# Patient Record
Sex: MALE | Race: WHITE | NOT HISPANIC OR LATINO | Employment: FULL TIME | URBAN - METROPOLITAN AREA
[De-identification: names, ages, dates, MRNs, and addresses within clinical notes are randomized per-mention and may not be internally consistent; named-entity substitution may affect disease eponyms.]

---

## 2021-01-20 ENCOUNTER — OFFICE VISIT (OUTPATIENT)
Dept: URGENT CARE | Facility: CLINIC | Age: 56
End: 2021-01-20
Payer: MEDICARE

## 2021-01-20 VITALS
DIASTOLIC BLOOD PRESSURE: 78 MMHG | SYSTOLIC BLOOD PRESSURE: 118 MMHG | TEMPERATURE: 98.2 F | HEIGHT: 71 IN | WEIGHT: 311 LBS | HEART RATE: 101 BPM | BODY MASS INDEX: 43.54 KG/M2 | RESPIRATION RATE: 20 BRPM | OXYGEN SATURATION: 97 %

## 2021-01-20 DIAGNOSIS — H00.035 CELLULITIS OF LEFT LOWER EYELID: ICD-10-CM

## 2021-01-20 DIAGNOSIS — H00.025 HORDEOLUM INTERNUM LEFT LOWER EYELID: Primary | ICD-10-CM

## 2021-01-20 PROCEDURE — 99203 OFFICE O/P NEW LOW 30 MIN: CPT | Performed by: PHYSICIAN ASSISTANT

## 2021-01-20 RX ORDER — CEPHALEXIN 500 MG/1
500 CAPSULE ORAL EVERY 12 HOURS SCHEDULED
Qty: 14 CAPSULE | Refills: 0 | Status: SHIPPED | OUTPATIENT
Start: 2021-01-20 | End: 2021-01-27

## 2021-01-20 RX ORDER — PRAZOSIN HYDROCHLORIDE 5 MG/1
5 CAPSULE ORAL 2 TIMES DAILY
COMMUNITY

## 2021-01-20 RX ORDER — ERYTHROMYCIN 5 MG/G
0.5 OINTMENT OPHTHALMIC EVERY 12 HOURS SCHEDULED
Qty: 7 G | Refills: 0 | Status: SHIPPED | OUTPATIENT
Start: 2021-01-20

## 2021-01-20 NOTE — PATIENT INSTRUCTIONS
[facility]   WHAT YOU NEED TO KNOW:   A stye is a lump on the edge or inside of your eyelid caused by an infection  A stye can form on your upper or lower eyelid  It usually goes away in 2 to 4 days  DISCHARGE INSTRUCTIONS:   Medicines:   · Antibiotic medicine: This is given as an ointment to put into your eye  It is used to fight an infection caused by bacteria  Use as directed  · Take your medicine as directed  Contact your healthcare provider if you think your medicine is not helping or if you have side effects  Tell him of her if you are allergic to any medicine  Keep a list of the medicines, vitamins, and herbs you take  Include the amounts, and when and why you take them  Bring the list or the pill bottles to follow-up visits  Carry your medicine list with you in case of an emergency  Follow up with your healthcare provider as directed:  Write down your questions so you remember to ask them during your visits  Self-care:   · Use warm compresses: This will help decrease swelling and pain  Wet a clean washcloth with warm water and place it on your eye for 10 to 15 minutes, 3 to 4 times each day or as directed  · Keep your hands away from your eye: This helps to prevent the spread of the infection to other parts of the eye  Wash your hands often with soap and dry with a clean towel  Do not squeeze the stye  · Do not use eye makeup:  Do not wear eye makeup while you have a stye  Eye makeup may carry bacteria and cause another stye  Throw away eye makeup and brushes used to apply the makeup  Use new eye makeup after the stye has gone away  Do not share eye makeup with others  · Prevent another stye:  Wash your face and clean your eyelashes every day  Remove eye makeup with makeup remover  This helps to completely remove eye makeup without heavy rubbing  Contact your healthcare provider if:   · You have redness and discharge around your eye, and your eye pain is getting worse      · Your vision changes  · The stye has not gone away within 7 days  · The stye comes back within a short period of time after treatment  · You have questions or concerns about your condition or care  © Copyright [address] Information is for End User's use only and may not be sold, redistributed or otherwise used for commercial purposes  All illustrations and images included in CareNotes® are the copyrighted property of A D A M , Inc  or [facility] Halina Harrison   The above information is an  only  It is not intended as medical advice for individual conditions or treatments  Talk to your doctor, nurse or pharmacist before following any medical regimen to see if it is safe and effective for you

## 2021-01-20 NOTE — PROGRESS NOTES
[facility]        NAME: Sapna Olvera is a 50 y o  male  : 1965    MRN: [id number]  DATE: 2021  TIME: 7:10 PM    Assessment and Plan   Hordeolum internum left lower eyelid [H00 025]  1  Hordeolum internum left lower eyelid  cephalexin (KEFLEX) 500 mg capsule    erythromycin (ILOTYCIN) ophthalmic ointment   2  Cellulitis of left lower eyelid  cephalexin (KEFLEX) 500 mg capsule    erythromycin (ILOTYCIN) ophthalmic ointment         Patient Instructions     Discussed condition with pt  He appears to have an internal stye left lower lid that is infected and draining and is now causing left lower lid inflammation  Will prescribe him combination of oral and topical abx and rec warm compresses to the eye and keeping clear of crusting/discharge  Should be reevaluated if condition persists/worsens  Follow up with PCP in 3-5 days  Proceed to  ER if symptoms worsen  Chief Complaint     Chief Complaint   Patient presents with    Eye Problem     Pt here for left eye issues pt states   swelling under his eye  that has been draining,  no meds used  History of Present Illness       Pt presents with a few day history of left lower eyelid swelling, redness, pain, discharge  He denies injury/trauma  Reports crusting on lids as well  Denies photophobia, visual changes, or any symptoms in the right eye  Has had similar condition in the past that was treated with ointment and it resolved  Review of Systems   Review of Systems   Constitutional: Negative  Eyes: Positive for pain, discharge and redness  Negative for photophobia, itching and visual disturbance  Pertinent positives pertain to left lower eyelid   Respiratory: Negative  Cardiovascular: Negative  Gastrointestinal: Negative  Genitourinary: Negative            Current Medications       Current Outpatient Medications:     prazosin (MINIPRESS) 5 mg capsule, Take 5 mg by mouth 2 (two) times a day, Disp: , Rfl:   cephalexin (KEFLEX) 500 mg capsule, Take 1 capsule (500 mg total) by mouth every 12 (twelve) hours for 7 days, Disp: 14 capsule, Rfl: 0    erythromycin (ILOTYCIN) ophthalmic ointment, Administer 0 5 inches into the left eye every 12 (twelve) hours, Disp: 7 g, Rfl: 0    Current Allergies     Allergies as of 01/20/2021 - Reviewed 01/20/2021   Allergen Reaction Noted    Minipress [prazosin] Hives 01/20/2021            The following portions of the patient's history were reviewed and updated as appropriate: allergies, current medications, past family history, past medical history, past social history, past surgical history and problem list      History reviewed  No pertinent past medical history  History reviewed  No pertinent surgical history  History reviewed  No pertinent family history  Medications have been verified  Objective   /78 (BP Location: Right arm, Patient Position: Sitting, Cuff Size: Large)   Pulse 101   Temp 98 2 °F (36 8 °C) (Tympanic)   Resp 20   Ht 5' 10 5" (1 791 m)   Wt (!) 141 kg (311 lb)   SpO2 97%   BMI 43 99 kg/m²   No LMP for male patient  Physical Exam     Physical Exam  Vitals signs reviewed  Constitutional:       General: He is not in acute distress  Appearance: He is well-developed  Eyes:      Comments: Left lower lid with diffuse mild STS, erythema, crusting on lid margins, and small amt of purulent discharge  There is a visible internal hordeolum present  Mild conjunctival injection  No photophobia  Right eye exam is normal     Neurological:      Mental Status: He is alert and oriented to person, place, and time

## 2024-01-29 ENCOUNTER — OFFICE VISIT (OUTPATIENT)
Dept: URGENT CARE | Facility: CLINIC | Age: 59
End: 2024-01-29
Payer: MEDICARE

## 2024-01-29 VITALS
SYSTOLIC BLOOD PRESSURE: 136 MMHG | WEIGHT: 276 LBS | RESPIRATION RATE: 20 BRPM | BODY MASS INDEX: 38.64 KG/M2 | DIASTOLIC BLOOD PRESSURE: 80 MMHG | TEMPERATURE: 98.1 F | OXYGEN SATURATION: 97 % | HEIGHT: 71 IN | HEART RATE: 96 BPM

## 2024-01-29 DIAGNOSIS — B35.1 ONYCHOMYCOSIS: ICD-10-CM

## 2024-01-29 DIAGNOSIS — J02.9 ACUTE VIRAL PHARYNGITIS: Primary | ICD-10-CM

## 2024-01-29 LAB — S PYO AG THROAT QL: NEGATIVE

## 2024-01-29 PROCEDURE — 99213 OFFICE O/P EST LOW 20 MIN: CPT | Performed by: FAMILY MEDICINE

## 2024-01-29 PROCEDURE — 87070 CULTURE OTHR SPECIMN AEROBIC: CPT | Performed by: FAMILY MEDICINE

## 2024-01-29 PROCEDURE — 87880 STREP A ASSAY W/OPTIC: CPT | Performed by: FAMILY MEDICINE

## 2024-01-29 RX ORDER — HALOPERIDOL DECANOATE 100 MG/ML
INJECTION INTRAMUSCULAR
COMMUNITY

## 2024-01-29 NOTE — PROGRESS NOTES
[facility]        NAME: Osmany Perea is a 58 y.o. male  : 1965    MRN: [id number]  DATE: 2024  TIME: 9:27 AM    Assessment and Plan   Acute viral pharyngitis [J02.9]  1. Acute viral pharyngitis  POCT rapid strepA    Throat culture      2. Onychomycosis  Ambulatory Referral to Podiatry            Patient Instructions     Patient Instructions   - rapid strep test in office is negative, throat swab sent for culture testing, patient has been instructed to call the office in 2 days to follow up culture results  - take Tylenol or Motrin as needed for pain/fever   - patient is to rest and drink plenty of fluids   - advised warm salt water gargles and throat lozenges as needed    - drink warm tea w/ lemon and honey   - may use Chloraseptic throat spray as needed   - follow up w/ PCP office for re-check in 3-5 days  - if symptoms persist despite treatment, worsen, or any new symptoms present, patient is to be seen in the ER.    2. Onychomycosis  - referral provided to Podiatry for further evaluation and treatment    Follow up with PCP in 3-5 days.  Proceed to  ER if symptoms worsen.    Chief Complaint     Chief Complaint   Patient presents with    Foot Problem     Pt states fungus on toenails x 20 years. [facility].    Sore Throat     Pt here for sore throat x 3 days, no fever. Pt used Garlic and Advil.         History of Present Illness       57 yo male presents c/o sore throat x 3 days. No other URI symptoms. No fever/chills. No headache or body aches. Patient is a daily smoker. No abdominal pain or GI sx. No loss of taste or smell. No neck pain or swelling. No feelings of throat closing or difficulty swallowing. No drooling or muffled voice. No recent travel or known exposure to sick contacts. He has been taking Advil and garlic supplements as needed which is helping with the throat pain. Rapid strep test performed in office today is negative.   Patient also  Referral has been refaxed to [facility] at the requested fax # and again to the fax# on file for Dr. Addie Damon. "states he has chronic fungal toenail infection, he states [facility], however [facility] not been [facility]. Patient is inquiring regarding treatment options.      Review of Systems   Review of Systems   Constitutional: Negative.    HENT:          As noted in HPI   Eyes: Negative.    Respiratory: Negative.     Cardiovascular: Negative.    Gastrointestinal: Negative.    Musculoskeletal: Negative.    Skin:         As noted in HPI   Allergic/Immunologic:        As noted in chart   Neurological: Negative.    Hematological: Negative.          Current Medications       Current Outpatient Medications:     haloperidol decanoate (Haldol Decanoate) 100 mg/mL injection, by Intramuscular - haloperidol decanoate route every 28 days, Disp: , Rfl:     Current Allergies     Allergies as of 01/29/2024 - Reviewed 01/29/2024   Allergen Reaction Noted    Minipress [prazosin] Hives 01/20/2021            The following portions of the patient's history were reviewed and updated as appropriate: allergies, current medications, past family history, past medical history, past social history, past surgical history and problem list.     Past Medical History:   Diagnosis Date    Anxiety     H/O schizophrenia     Hyperlipidemia     Hypertension     PTSD (post-traumatic stress disorder)        Past Surgical History:   Procedure Laterality Date    NO PAST SURGERIES         History reviewed. No pertinent family history.      Medications have been verified.        Objective   /80   Pulse 96   Temp 98.1 °F (36.7 °C)   Resp 20   Ht 5' 10.5\" (1.791 m)   Wt 125 kg (276 lb)   SpO2 97%   BMI 39.04 kg/m²   No LMP for male patient.       Physical Exam     Physical Exam  Vitals and nursing note reviewed.   Constitutional:       General: He is awake. He is not in acute distress.     Appearance: Normal appearance. He is well-developed and well-groomed. He is not ill-appearing, toxic-appearing or diaphoretic.   HENT:      Head: " Normocephalic and atraumatic.      Jaw: There is normal jaw occlusion.      Right Ear: Tympanic membrane, ear canal and external ear normal.      Left Ear: Tympanic membrane, ear canal and external ear normal.      Nose: Nose normal.      Mouth/Throat:      Lips: Pink. No lesions.      Mouth: Mucous membranes are moist. No angioedema.      Pharynx: Uvula midline. Posterior oropharyngeal erythema present. No pharyngeal swelling, oropharyngeal exudate or uvula swelling.      Tonsils: No tonsillar exudate or tonsillar abscesses.      Comments: Erythema of the pharynx, otherwise clear, no exudates, no abscess.  Airway fully patent.  Neck:      Trachea: Trachea and phonation normal.   Cardiovascular:      Rate and Rhythm: Normal rate and regular rhythm.      Pulses: Normal pulses.      Heart sounds: Normal heart sounds.   Pulmonary:      Effort: Pulmonary effort is normal. No tachypnea, accessory muscle usage or respiratory distress.      Breath sounds: Normal breath sounds and air entry.   Musculoskeletal:      Cervical back: Normal range of motion and neck supple. No edema, erythema, rigidity or tenderness.   Lymphadenopathy:      Cervical: No cervical adenopathy.   Skin:     General: Skin is warm and dry.      Capillary Refill: Capillary refill takes less than 2 seconds.      Findings: No abrasion, abscess, bruising, ecchymosis, erythema, rash or wound.      Comments: There is a yellowish discoloration and thickening of all the toenails on both feet consistent with onychomycosis.   Neurological:      Mental Status: He is alert and oriented to person, place, and time. Mental status is at baseline.   Psychiatric:         Mood and Affect: Mood normal.         Behavior: Behavior normal. Behavior is cooperative.         Thought Content: Thought content normal.         Judgment: Judgment normal.

## 2024-01-29 NOTE — PATIENT INSTRUCTIONS
- rapid strep test in office is negative, throat swab sent for culture testing, patient has been instructed to call the office in 2 days to follow up culture results  - take Tylenol or Motrin as needed for pain/fever   - patient is to rest and drink plenty of fluids   - advised warm salt water gargles and throat lozenges as needed    - drink warm tea w/ lemon and honey   - may use Chloraseptic throat spray as needed   - follow up w/ PCP office for re-check in 3-5 days  - if symptoms persist despite treatment, worsen, or any new symptoms present, patient is to be seen in the ER.    2. Onychomycosis  - referral provided to Podiatry for further evaluation and treatment

## 2024-01-31 LAB — BACTERIA THROAT CULT: NORMAL

## 2024-02-01 ENCOUNTER — OFFICE VISIT (OUTPATIENT)
Age: 59
End: 2024-02-01
Payer: MEDICARE

## 2024-02-01 ENCOUNTER — APPOINTMENT (OUTPATIENT)
Dept: LAB | Facility: CLINIC | Age: 59
End: 2024-02-01
Payer: MEDICARE

## 2024-02-01 VITALS
DIASTOLIC BLOOD PRESSURE: 85 MMHG | SYSTOLIC BLOOD PRESSURE: 130 MMHG | BODY MASS INDEX: 38.64 KG/M2 | HEIGHT: 71 IN | WEIGHT: 276 LBS | HEART RATE: 106 BPM

## 2024-02-01 DIAGNOSIS — B35.1 ONYCHOMYCOSIS: ICD-10-CM

## 2024-02-01 LAB
ALBUMIN SERPL BCP-MCNC: 4.5 G/DL (ref 3.5–5)
ALP SERPL-CCNC: 69 U/L (ref 34–104)
ALT SERPL W P-5'-P-CCNC: 34 U/L (ref 7–52)
AST SERPL W P-5'-P-CCNC: 23 U/L (ref 13–39)
BILIRUB DIRECT SERPL-MCNC: 0.1 MG/DL (ref 0–0.2)
BILIRUB SERPL-MCNC: 0.68 MG/DL (ref 0.2–1)
PROT SERPL-MCNC: 7.5 G/DL (ref 6.4–8.4)

## 2024-02-01 PROCEDURE — 88305 TISSUE EXAM BY PATHOLOGIST: CPT | Performed by: STUDENT IN AN ORGANIZED HEALTH CARE EDUCATION/TRAINING PROGRAM

## 2024-02-01 PROCEDURE — 99203 OFFICE O/P NEW LOW 30 MIN: CPT | Performed by: STUDENT IN AN ORGANIZED HEALTH CARE EDUCATION/TRAINING PROGRAM

## 2024-02-01 PROCEDURE — 88312 SPECIAL STAINS GROUP 1: CPT | Performed by: STUDENT IN AN ORGANIZED HEALTH CARE EDUCATION/TRAINING PROGRAM

## 2024-02-01 PROCEDURE — 80076 HEPATIC FUNCTION PANEL: CPT

## 2024-02-01 PROCEDURE — 36415 COLL VENOUS BLD VENIPUNCTURE: CPT

## 2024-02-01 RX ORDER — HYDROXYZINE HYDROCHLORIDE 10 MG/1
1 TABLET, FILM COATED ORAL DAILY PRN
COMMUNITY
Start: 2023-08-17

## 2024-02-01 RX ORDER — SIMVASTATIN 20 MG
10 TABLET ORAL
COMMUNITY
Start: 2023-10-06

## 2024-02-01 RX ORDER — DICLOFENAC SODIUM 75 MG/1
75 TABLET, DELAYED RELEASE ORAL
COMMUNITY
Start: 2023-10-06

## 2024-02-01 NOTE — PROGRESS NOTES
"This patient was seen on  2/1/24 .    My role is Foot , Ankle, and Wound Specialist    ASSESSMENT     Diagnoses and all orders for this visit:    Onychomycosis  -     Ambulatory Referral to Podiatry  -     Hepatic function panel; Future  -     Tissue Exam    Other orders  -     hydrOXYzine HCL (ATARAX) 10 mg tablet; Take 1 tablet by mouth daily as needed  -     diclofenac (VOLTAREN) 75 mg EC tablet; 75 mg  -     simvastatin (ZOCOR) 20 mg tablet; 10 mg         Problem List Items Addressed This Visit    None  Visit Diagnoses       Onychomycosis        Relevant Orders    Hepatic function panel (Completed)    Tissue Exam          PLAN  -Patient was educated regarding their condition  -Biopsy of nail sent for PAS stain  -If the cultures come back positive, we will pursue a course of PO terbinafine  -F/u hepatic function panel in anticipation of terbinafine treatment  -RTC in 6-months      SUBJECTIVE    Chief Complaint:  Discolored nails     Patient ID: Osmany Perea     2/1/2024: Osmany is a pleasant 58-year-old male who presents today with concerns of discolored nails.  He states that they have been discolored since around 1997.  He does endorse injury to his toes/nails in the past.        The following portions of the patient's history were reviewed and updated as appropriate: allergies, current medications, past family history, past medical history, past social history, past surgical history and problem list.    Review of Systems   Constitutional: Negative.    Respiratory: Negative.     Cardiovascular: Negative.    Gastrointestinal: Negative.    Genitourinary: Negative.    Musculoskeletal: Negative.    Skin:  Positive for color change.         OBJECTIVE      /85   Pulse (!) 106   Ht 5' 10.5\" (1.791 m)   Wt 125 kg (276 lb)   BMI 39.04 kg/m²        Physical Exam  Constitutional:       Appearance: Normal appearance.   HENT:      Head: Normocephalic and atraumatic.   Eyes:      General:         Right eye: No " discharge.         Left eye: No discharge.   Cardiovascular:      Rate and Rhythm: Normal rate and regular rhythm.      Pulses:           Dorsalis pedis pulses are 2+ on the right side and 2+ on the left side.        Posterior tibial pulses are 2+ on the right side and 2+ on the left side.   Pulmonary:      Effort: Pulmonary effort is normal.      Breath sounds: Normal breath sounds.   Feet:      Right foot:      Toenail Condition: Right toenails are abnormally thick and long. Fungal disease present.     Left foot:      Toenail Condition: Left toenails are abnormally thick and long. Fungal disease present.  Skin:     General: Skin is warm.      Capillary Refill: Capillary refill takes less than 2 seconds.   Neurological:      Mental Status: He is alert and oriented to person, place, and time.      Sensory: Sensation is intact. No sensory deficit.   Psychiatric:         Mood and Affect: Mood normal.

## 2024-02-04 PROCEDURE — 88305 TISSUE EXAM BY PATHOLOGIST: CPT | Performed by: STUDENT IN AN ORGANIZED HEALTH CARE EDUCATION/TRAINING PROGRAM

## 2024-02-04 PROCEDURE — 88312 SPECIAL STAINS GROUP 1: CPT | Performed by: STUDENT IN AN ORGANIZED HEALTH CARE EDUCATION/TRAINING PROGRAM

## 2024-02-05 ENCOUNTER — TELEPHONE (OUTPATIENT)
Age: 59
End: 2024-02-05

## 2025-06-30 ENCOUNTER — OFFICE VISIT (OUTPATIENT)
Dept: URGENT CARE | Facility: CLINIC | Age: 60
End: 2025-06-30
Payer: MEDICARE

## 2025-06-30 VITALS
TEMPERATURE: 98.4 F | BODY MASS INDEX: 34.18 KG/M2 | OXYGEN SATURATION: 96 % | DIASTOLIC BLOOD PRESSURE: 60 MMHG | SYSTOLIC BLOOD PRESSURE: 108 MMHG | RESPIRATION RATE: 18 BRPM | HEART RATE: 97 BPM | WEIGHT: 241.6 LBS

## 2025-06-30 DIAGNOSIS — J06.9 VIRAL URI WITH COUGH: Primary | ICD-10-CM

## 2025-06-30 PROBLEM — R47.01 APHASIA: Status: ACTIVE | Noted: 2025-06-30

## 2025-06-30 PROBLEM — I63.9 CEREBRAL INFARCTION, UNSPECIFIED (HCC): Status: ACTIVE | Noted: 2025-06-30

## 2025-06-30 PROBLEM — R31.29 OTHER MICROSCOPIC HEMATURIA: Status: ACTIVE | Noted: 2025-06-30

## 2025-06-30 PROBLEM — I86.1 SCROTAL VARICES: Status: ACTIVE | Noted: 2025-06-30

## 2025-06-30 PROBLEM — F25.0 SCHIZOAFFECTIVE DISORDER, BIPOLAR TYPE (HCC): Status: ACTIVE | Noted: 2025-06-30

## 2025-06-30 PROBLEM — R53.1 WEAKNESS: Status: ACTIVE | Noted: 2025-06-30

## 2025-06-30 PROBLEM — Z72.0 TOBACCO USER: Status: ACTIVE | Noted: 2025-06-30

## 2025-06-30 PROBLEM — F31.9 BIPOLAR DISORDER, UNSPECIFIED (HCC): Status: ACTIVE | Noted: 2025-06-30

## 2025-06-30 PROBLEM — E78.5 HYPERLIPIDEMIA, UNSPECIFIED: Status: ACTIVE | Noted: 2025-06-30

## 2025-06-30 PROBLEM — E66.9 OBESITY: Status: ACTIVE | Noted: 2025-06-30

## 2025-06-30 PROBLEM — K08.531 FRACTURED DENTAL RESTORATIVE MATERIAL WITH LOSS OF MATERIAL: Status: ACTIVE | Noted: 2025-06-30

## 2025-06-30 PROBLEM — I10 BENIGN ESSENTIAL HYPERTENSION: Status: ACTIVE | Noted: 2025-06-30

## 2025-06-30 PROBLEM — F20.9 SCHIZOPHRENIA, UNSPECIFIED (HCC): Status: ACTIVE | Noted: 2025-06-30

## 2025-06-30 PROBLEM — B35.1 TINEA UNGUIUM: Status: ACTIVE | Noted: 2025-06-30

## 2025-06-30 PROBLEM — F43.10 POSTTRAUMATIC STRESS DISORDER: Status: ACTIVE | Noted: 2025-06-30

## 2025-06-30 PROBLEM — F43.10 POST-TRAUMATIC STRESS DISORDER, UNSPECIFIED: Status: ACTIVE | Noted: 2025-06-30

## 2025-06-30 PROBLEM — Z13.5 ENCOUNTER FOR SCREENING FOR EYE AND EAR DISORDERS: Status: ACTIVE | Noted: 2025-06-30

## 2025-06-30 PROBLEM — Z77.29 EXPOSURE TO POTENTIALLY HAZARDOUS SUBSTANCE: Status: ACTIVE | Noted: 2025-06-30

## 2025-06-30 PROBLEM — Z71.9 COUNSELING, UNSPECIFIED: Status: ACTIVE | Noted: 2025-06-30

## 2025-06-30 PROBLEM — Z77.29 CONTACT WITH AND (SUSPECTED) EXPOSURE TO OTHER HAZARDOUS SUBSTANCES: Status: ACTIVE | Noted: 2025-06-30

## 2025-06-30 PROBLEM — J30.89 OTHER ALLERGIC RHINITIS: Status: ACTIVE | Noted: 2025-06-30

## 2025-06-30 PROBLEM — I67.89 OTHER CEREBROVASCULAR DISEASE: Status: ACTIVE | Noted: 2025-06-30

## 2025-06-30 PROBLEM — K05.322 CHRONIC PERIODONTITIS, GENERALIZED, MODERATE: Status: ACTIVE | Noted: 2025-06-30

## 2025-06-30 PROBLEM — N40.1 BENIGN PROSTATIC HYPERPLASIA WITH LOWER URINARY TRACT SYMPTOMS: Status: ACTIVE | Noted: 2025-06-30

## 2025-06-30 PROBLEM — R20.2 PARESTHESIA OF SKIN: Status: ACTIVE | Noted: 2025-06-30

## 2025-06-30 PROCEDURE — 99213 OFFICE O/P EST LOW 20 MIN: CPT | Performed by: FAMILY MEDICINE

## 2025-06-30 PROCEDURE — G2211 COMPLEX E/M VISIT ADD ON: HCPCS | Performed by: FAMILY MEDICINE

## 2025-06-30 NOTE — PROGRESS NOTES
[facility]        NAME: Osmany Perea is a 60 y.o. male  : 1965    MRN: [id number]  DATE: 2025  TIME: 9:15 AM    Assessment and Plan   Viral URI with cough [J06.9]  1. Viral URI with cough          Patient Instructions     Patient Instructions   - clinical presentation is consistent with a viral upper respiratory infection-- there are no signs of a bacterial infection present on exam at this time.   - patient is to rest and drink plenty of fluids  - take Tylenol as needed for pain/fever   - advised warm salt water gargles and throat lozenges as needed.   - drink warm tea w/ lemon and honey   - run a humidifier at home to help w/ congestion   - advised using a nasal spray to help w/ nasal/sinus symptoms.  - if symptoms persist despite treatment, worsen, or any new symptoms present, should be seen by PCP office for re-check.    Follow up with PCP in 3-5 days.  Proceed to  ER if symptoms worsen.    If tests have been performed at [facility], our office will contact you with results if changes need to be made to the care plan discussed with you at the visit.  [facility]    Chief Complaint     Chief Complaint   Patient presents with    Cold Like Symptoms     Ear fullness, sore throat, cough, has been ill x 2 days, taking garlic supplements.     History of Present Illness     61 yo male presents c/o nasal congestion, post-nasal drip, mild sore throat, ears feeling clogged, and mildly productive cough. He has been ill x 2 days. No fever/chills. No headache or body aches. No chest pain, SOB, or wheezing. Patient is a smoker. No GI sx. No recent travel or known sick contacts. Patient states he has been taking garlic supplements and feels those are helping for the symptoms.      Review of Systems   Review of Systems   Constitutional: Negative.    HENT:  Positive for congestion, postnasal drip and sore throat.    Eyes: Negative.    Respiratory:  Positive  for cough.    Cardiovascular: Negative.    Gastrointestinal: Negative.    Musculoskeletal: Negative.    Skin: Negative.    Allergic/Immunologic:        Minipress   Neurological: Negative.    Hematological: Negative.      Current Medications     Current Medications[1]    Current Allergies     Allergies as of 06/30/2025 - Reviewed 06/30/2025   Allergen Reaction Noted    Minipress [prazosin] Hives 01/20/2021            The following portions of the patient's history were reviewed and updated as appropriate: allergies, current medications, past family history, past medical history, past social history, past surgical history and problem list.     Past Medical History[2]    Past Surgical History[3]    Family History[4]      Medications have been verified.        Objective   /60 (BP Location: Left arm, Patient Position: Sitting, Cuff Size: Large)   Pulse 97   Temp 98.4 °F (36.9 °C) (Temporal)   Resp 18   Wt 110 kg (241 lb 9.6 oz)   SpO2 96%   BMI 34.18 kg/m²   No LMP for male patient.       Physical Exam     Physical Exam  Vitals and nursing note reviewed.   Constitutional:       General: He is awake. He is not in acute distress.     Appearance: Normal appearance. He is well-developed and well-groomed. He is not ill-appearing, toxic-appearing or diaphoretic.   HENT:      Head: Normocephalic and atraumatic.      Jaw: There is normal jaw occlusion.      Right Ear: Tympanic membrane, ear canal and external ear normal.      Left Ear: Tympanic membrane, ear canal and external ear normal.      Nose: Congestion present.      Mouth/Throat:      Lips: Pink. No lesions.      Mouth: Mucous membranes are moist.      Pharynx: Uvula midline. Posterior oropharyngeal erythema and postnasal drip present. No pharyngeal swelling, oropharyngeal exudate or uvula swelling.      Tonsils: No tonsillar exudate or tonsillar abscesses.     Eyes:      General: Lids are normal.      Conjunctiva/sclera: Conjunctivae normal.     Neck:       Trachea: Trachea and phonation normal.     Cardiovascular:      Rate and Rhythm: Normal rate and regular rhythm.      Pulses: Normal pulses.      Heart sounds: Normal heart sounds.   Pulmonary:      Effort: Pulmonary effort is normal. No tachypnea, accessory muscle usage or respiratory distress.      Breath sounds: Normal breath sounds and air entry.     Musculoskeletal:      Cervical back: Neck supple. No edema, erythema, rigidity or tenderness.   Lymphadenopathy:      Cervical: No cervical adenopathy.     Skin:     General: Skin is warm and dry.      Capillary Refill: Capillary refill takes less than 2 seconds.      Coloration: Skin is not pale.     Neurological:      Mental Status: He is alert and oriented to person, place, and time. Mental status is at baseline.     Psychiatric:         Mood and Affect: Mood normal.         Behavior: Behavior normal. Behavior is cooperative.         Thought Content: Thought content normal.         Judgment: Judgment normal.            [1]   Current Outpatient Medications:     haloperidol decanoate (Haldol Decanoate) 100 mg/mL injection, by Intramuscular - haloperidol decanoate route every 28 days, Disp: , Rfl:     diclofenac (VOLTAREN) 75 mg EC tablet, 75 mg (Patient not taking: Reported on 6/30/2025), Disp: , Rfl:     hydrOXYzine HCL (ATARAX) 10 mg tablet, Take 1 tablet by mouth daily as needed (Patient not taking: Reported on 6/30/2025), Disp: , Rfl:     simvastatin (ZOCOR) 20 mg tablet, 10 mg (Patient not taking: Reported on 6/30/2025), Disp: , Rfl:   [2]   Past Medical History:  Diagnosis Date    Anxiety     H/O schizophrenia     Hyperlipidemia     Hypertension     PTSD (post-traumatic stress disorder)     Stroke (HCC)    [3]   Past Surgical History:  Procedure Laterality Date    NO PAST SURGERIES     [4] No family history on file.

## 2025-06-30 NOTE — PATIENT INSTRUCTIONS
- clinical presentation is consistent with a viral upper respiratory infection-- there are no signs of a bacterial infection present on exam at this time.   - patient is to rest and drink plenty of fluids  - take Tylenol as needed for pain/fever   - advised warm salt water gargles and throat lozenges as needed.   - drink warm tea w/ lemon and honey   - run a humidifier at home to help w/ congestion   - advised using a nasal spray to help w/ nasal/sinus symptoms.  - if symptoms persist despite treatment, worsen, or any new symptoms present, should be seen by PCP office for re-check.

## 2025-07-02 ENCOUNTER — OFFICE VISIT (OUTPATIENT)
Dept: URGENT CARE | Facility: CLINIC | Age: 60
End: 2025-07-02
Payer: MEDICARE

## 2025-07-02 VITALS
WEIGHT: 238.2 LBS | DIASTOLIC BLOOD PRESSURE: 80 MMHG | BODY MASS INDEX: 33.7 KG/M2 | RESPIRATION RATE: 22 BRPM | HEART RATE: 92 BPM | SYSTOLIC BLOOD PRESSURE: 144 MMHG | TEMPERATURE: 98 F | OXYGEN SATURATION: 97 %

## 2025-07-02 DIAGNOSIS — J06.9 UPPER RESPIRATORY TRACT INFECTION, UNSPECIFIED TYPE: Primary | ICD-10-CM

## 2025-07-02 PROCEDURE — G2211 COMPLEX E/M VISIT ADD ON: HCPCS | Performed by: FAMILY MEDICINE

## 2025-07-02 PROCEDURE — 99213 OFFICE O/P EST LOW 20 MIN: CPT | Performed by: FAMILY MEDICINE

## 2025-07-02 RX ORDER — LISINOPRIL 5 MG/1
5 TABLET ORAL DAILY
COMMUNITY
Start: 2025-03-14

## 2025-07-02 RX ORDER — METHYLPREDNISOLONE 4 MG/1
TABLET ORAL
Qty: 21 TABLET | Refills: 0 | Status: SHIPPED | OUTPATIENT
Start: 2025-07-02

## 2025-07-02 RX ORDER — ASPIRIN 81 MG/1
81 TABLET, CHEWABLE ORAL DAILY
COMMUNITY

## 2025-07-02 RX ORDER — AZITHROMYCIN 250 MG/1
TABLET, FILM COATED ORAL
Qty: 6 TABLET | Refills: 0 | Status: SHIPPED | OUTPATIENT
Start: 2025-07-02 | End: 2025-07-06

## 2025-07-02 NOTE — PROGRESS NOTES
[facility]        NAME: Osmany Perea is a 60 y.o. male  : 1965    MRN: [id number]  DATE: 2025  TIME: 4:53 PM    Assessment and Plan   Upper respiratory tract infection, unspecified type [J06.9]  1. Upper respiratory tract infection, unspecified type  methylPREDNISolone 4 MG tablet therapy pack    azithromycin (ZITHROMAX) 250 mg tablet          Steroids and Zpack given for infection/congestion. No signs of bacterial infection today. Follow up with PCP in 3-5 days if no improvement. Proceed to ER if symptoms worsen.    Medical Decision Making     PROBLEM: 1 acute, uncomplicated illness or injury    DATA: NA    RISK: Prescription drug management    Chief Complaint     Chief Complaint   Patient presents with   • Swollen Glands     Pt here for f/u  on lip  is swollen,  swollen glands on left side of neck  and now sharp in left ear when swallowing, cough, no fever.  No meds given at last visit.          History of Present Illness     Osmany Perea is a 60 y.o. male presenting to the office today for upper respiratory complaints. Symptoms have been present for 1 week. Notes that his ear pain is getting worse. Also has noticed left sided throat tenderness and lip swelling. Feels like he is getting worse. Was initially told it was viral, but has not been improving.       Review of Systems     Review of Systems   Constitutional:  Negative for chills, fatigue and fever.   HENT:  Positive for congestion, ear pain, postnasal drip and sore throat. Negative for ear discharge, sinus pressure and sinus pain.    Eyes:  Negative for pain and discharge.   Respiratory:  Positive for cough. Negative for shortness of breath.    Cardiovascular:  Negative for chest pain and palpitations.   Gastrointestinal:  Negative for abdominal pain, diarrhea, nausea and vomiting.   Genitourinary:  Negative for difficulty urinating and dysuria.   Musculoskeletal:  Negative for arthralgias and myalgias.   Skin:  Negative  for rash.   Neurological:  Negative for dizziness, syncope, light-headedness, numbness and headaches.   Psychiatric/Behavioral:  Negative for agitation.    All other systems reviewed and are negative.      Current Medications     Current Medications[1]    Current Allergies     Allergies as of 07/02/2025 - Reviewed 07/02/2025   Allergen Reaction Noted   • Minipress [prazosin] Hives 01/20/2021            The following portions of the patient's history were reviewed and updated as appropriate: allergies, current medications, past family history, past medical history, past social history, past surgical history and problem list.     Past Medical History[2]    Past Surgical History[3]    Family History[4]    Medications have been verified.    Objective     /80   Pulse 92   Temp 98 °F (36.7 °C) (Tympanic)   Resp 22   Wt 108 kg (238 lb 3.2 oz)   SpO2 97%   BMI 33.70 kg/m²   No LMP for male patient.     Physical Exam     Physical Exam  Vitals reviewed.   Constitutional:       General: He is not in acute distress.     Appearance: Normal appearance. He is not ill-appearing.   HENT:      Head: Normocephalic and atraumatic.      Right Ear: Ear canal normal. A middle ear effusion is present. Tympanic membrane is not erythematous.      Left Ear: Ear canal normal. A middle ear effusion is present. Tympanic membrane is not erythematous.      Mouth/Throat:      Mouth: Mucous membranes are moist.      Pharynx: Posterior oropharyngeal erythema present. No oropharyngeal exudate.      Tonsils: No tonsillar exudate.     Eyes:      Extraocular Movements: Extraocular movements intact.      Conjunctiva/sclera: Conjunctivae normal.       Cardiovascular:      Rate and Rhythm: Normal rate and regular rhythm.      Pulses: Normal pulses.      Heart sounds: Normal heart sounds. No murmur heard.  Pulmonary:      Effort: Pulmonary effort is normal. No respiratory distress.      Breath sounds: Normal breath sounds. No wheezing.      Musculoskeletal:      Cervical back: Normal range of motion and neck supple. No tenderness.     Skin:     General: Skin is warm.     Neurological:      General: No focal deficit present.      Mental Status: He is alert.     Psychiatric:         Mood and Affect: Mood normal.         Behavior: Behavior normal.         Judgment: Judgment normal.                          [1]    Current Outpatient Medications:   •  aspirin 81 mg chewable tablet, Chew 81 mg daily, Disp: , Rfl:   •  azithromycin (ZITHROMAX) 250 mg tablet, Take 2 tablets today then 1 tablet daily x 4 days, Disp: 6 tablet, Rfl: 0  •  haloperidol decanoate (Haldol Decanoate) 100 mg/mL injection, by Intramuscular - haloperidol decanoate route every 28 days, Disp: , Rfl:   •  lisinopril (ZESTRIL) 5 mg tablet, Take 5 mg by mouth daily, Disp: , Rfl:   •  methylPREDNISolone 4 MG tablet therapy pack, Use as directed on package, Disp: 21 tablet, Rfl: 0  •  diclofenac (VOLTAREN) 75 mg EC tablet, 75 mg (Patient not taking: Reported on 7/2/2025), Disp: , Rfl:   •  hydrOXYzine HCL (ATARAX) 10 mg tablet, Take 1 tablet by mouth daily as needed (Patient not taking: Reported on 7/2/2025), Disp: , Rfl:   •  simvastatin (ZOCOR) 20 mg tablet, 10 mg (Patient not taking: Reported on 7/2/2025), Disp: , Rfl: [2]  Past Medical History:  Diagnosis Date   • Anxiety    • Clot     in head  per pt   • H/O schizophrenia    • Hyperlipidemia    • Hypertension    • PTSD (post-traumatic stress disorder)    • Stroke (HCC)    [3]  Past Surgical History:  Procedure Laterality Date   • NO PAST SURGERIES     [4]  No family history on file.